# Patient Record
Sex: FEMALE | Race: BLACK OR AFRICAN AMERICAN | NOT HISPANIC OR LATINO | Employment: UNEMPLOYED | URBAN - METROPOLITAN AREA
[De-identification: names, ages, dates, MRNs, and addresses within clinical notes are randomized per-mention and may not be internally consistent; named-entity substitution may affect disease eponyms.]

---

## 2023-10-06 ENCOUNTER — TELEPHONE (OUTPATIENT)
Dept: NEUROSURGERY | Facility: CLINIC | Age: 58
End: 2023-10-06
Payer: MEDICAID

## 2023-10-06 NOTE — TELEPHONE ENCOUNTER
----- Message from Zonia Shah sent at 10/6/2023 11:06 AM CDT -----  Regarding: appt  Contact: [phone]  Pt needs a neurosurgeon that accepts NP with medicaid. I checked the spreadsheet, no information in regards to that. Pls call to discuss.

## 2023-10-06 NOTE — TELEPHONE ENCOUNTER
Patient has been advised that we have no current availability for new spine medicaid patients. Patient was advised to contact the medicaid escalation line for assistance with scheduling.